# Patient Record
Sex: FEMALE
[De-identification: names, ages, dates, MRNs, and addresses within clinical notes are randomized per-mention and may not be internally consistent; named-entity substitution may affect disease eponyms.]

---

## 2022-09-15 PROBLEM — Z00.00 ENCOUNTER FOR PREVENTIVE HEALTH EXAMINATION: Status: ACTIVE | Noted: 2022-09-15

## 2022-09-20 ENCOUNTER — APPOINTMENT (OUTPATIENT)
Dept: NEUROSURGERY | Facility: CLINIC | Age: 47
End: 2022-09-20

## 2022-09-20 ENCOUNTER — TRANSCRIPTION ENCOUNTER (OUTPATIENT)
Age: 47
End: 2022-09-20

## 2022-09-20 PROCEDURE — EDU01: CPT

## 2022-09-22 NOTE — REASON FOR VISIT
[Home] : at home, [unfilled] , at the time of the visit. [Medical Office: ([facility])___] : at the medical office located in  [Patient] : the patient [Self] : self [New Patient Visit] : a new patient visit [FreeTextEntry1] : Pulsatile Tinnitus

## 2022-09-22 NOTE — HISTORY OF PRESENT ILLNESS
[de-identified] : I conducted a remote educational consult with CHRISTIAN MARCANO on 09/20/2022. I explained that I am only able to provide an educational consult and not render treatment as I am not licensed in the state in which [place]ST[place] MARC[place] is located. A written informed consent for the educational consult was obtained and is included in the EHR. CHRISTIAN MARCANO is informed that there would be a $250 cost associated with the conduct of the remote educational consult. \par \par Ms. MARCANO is a pleasant 47 year old female who presents today with a chief complaint of bilateral (R>L) ear pulsatile tinnitus.  It first started in 2019.  Since that time it has been getting progressively worse.  It is described as a constant, whooshing sound that is in sync with her pulse.  Turning her head to the right side makes her "pressure worse and does not relieve the sound".  Neck extension makes the pulsatile tinnitus worse.  \par \par She had TMJ manipulation which relieved the sound temporarily.\par \par Mild papilledema on last eye exam per patient, her insurance does not cover neuro-ophthalmology.\par \par She reports an associated pressure/pain in her right neck and head most of the time.  \par \par 7/2020 - LP with an opening pressure of 32cm H2O\par \par She tried Diamox 500mg daily for a month but she could not tolerate it due to GI upset.  She states it relieved "some" of the head pressure, but not significant.

## 2022-09-22 NOTE — ASSESSMENT
[FreeTextEntry1] : Impression:\par Bilateral (R>L) Pulsatile Tinnitus\par No change with ipsilateral neck pressure\par Mild Papilledema\par Right Sided Head Pain\par LP with opening pressure of 32cm H2O in 2020\par \par MR IAC w/wo 2018 reveals bilateral transverse sinus stenosis, RIGHT dominant\par \par Ms. CHRISTIAN MARCANO suffers from IIH and she has the classic appearance of lateral venous sinus stenosis on imaging.  Based on her imaging she is a good candidate for venous sinus stenting as an appropriate minimally invasive surgical treatment.  At this point, her pulsatile tinnitus is not very bothersome and her papilledema is mild.  She is not on any diamox/medical management.  I recommend see neuro-ophthalmology and getting more recent imaging.  If papilledema is present, I recommend another trial of medical management before proceeding with venous stenting. \par \par Recommendations:\par See Neuro-ophthalmology\par MRA Head w/wo TRICKS Protocol\par MRV Head w/wo\par MR Head w/wo\par Follow Up with Me After the Above

## 2022-11-22 ENCOUNTER — TRANSCRIPTION ENCOUNTER (OUTPATIENT)
Age: 47
End: 2022-11-22

## 2023-01-17 ENCOUNTER — NON-APPOINTMENT (OUTPATIENT)
Age: 48
End: 2023-01-17

## 2023-01-17 DIAGNOSIS — H93.A9 PULSATILE TINNITUS, UNSPECIFIED EAR: ICD-10-CM

## 2023-01-17 NOTE — ASSESSMENT
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 CHRISTIAN MARCANO suffers from IIH and she has the classic appearance of lateral venous sinus stenosis on imaging.  Based on her imaging she is a good candidate for venous sinus stenting as an appropriate minimally invasive surgical treatment.  At this point, her pulsatile tinnitus is not very bothersome.  She still has not been able to see neuro-ophthalmology due to insurance issue.  I urged her to see neuro-ophthalmology as soon as possible.  If papilledema is present, I recommend another trial of medical management before proceeding with venous stenting. \par \par Recommendations:\par See Neuro-ophthalmology\par Follow Up with Me After the Above\par \par Results of Imaging and Recommendations were discussed with patient via phone.

## 2023-01-17 NOTE — HISTORY OF PRESENT ILLNESS
[de-identified] : Ms. MARCANO is a pleasant 47 year old female who presents today for follow up of bilateral (R>L) ear pulsatile tinnitus.  It first started in 2019.  Since that time it has been getting progressively worse.  It is described as a constant, whooshing sound that is in sync with her pulse.  Turning her head to the right side makes her "pressure worse and does not relieve the sound".  Neck extension makes the pulsatile tinnitus worse.  \par \par She had TMJ manipulation which relieved the sound temporarily.\par \par Mild papilledema on last eye exam per patient, her insurance does not cover neuro-ophthalmology.\par \par She reports an associated pressure/pain in her right neck and head most of the time.  \par \par 7/2020 - LP with an opening pressure of 32cm H2O\par \par She tried Diamox 500mg daily for a month but she could not tolerate it due to GI upset.  She states it relieved "some" of the head pressure, but not significant.